# Patient Record
Sex: FEMALE
[De-identification: names, ages, dates, MRNs, and addresses within clinical notes are randomized per-mention and may not be internally consistent; named-entity substitution may affect disease eponyms.]

---

## 2018-03-20 PROBLEM — Z00.00 ENCOUNTER FOR PREVENTIVE HEALTH EXAMINATION: Status: ACTIVE | Noted: 2018-03-20

## 2018-03-21 ENCOUNTER — APPOINTMENT (OUTPATIENT)
Dept: ANTEPARTUM | Facility: CLINIC | Age: 48
End: 2018-03-21

## 2022-07-25 ENCOUNTER — RX RENEWAL (OUTPATIENT)
Age: 52
End: 2022-07-25

## 2022-07-25 DIAGNOSIS — M54.50 LOW BACK PAIN, UNSPECIFIED: ICD-10-CM

## 2022-11-22 ENCOUNTER — RX RENEWAL (OUTPATIENT)
Age: 52
End: 2022-11-22

## 2023-01-03 ENCOUNTER — RX RENEWAL (OUTPATIENT)
Age: 53
End: 2023-01-03

## 2023-02-27 RX ORDER — LIDOCAINE 5% 700 MG/1
5 PATCH TOPICAL
Qty: 30 | Refills: 0 | Status: ACTIVE | COMMUNITY
Start: 2022-07-25 | End: 1900-01-01

## 2023-02-28 ENCOUNTER — APPOINTMENT (OUTPATIENT)
Dept: PAIN MANAGEMENT | Facility: CLINIC | Age: 53
End: 2023-02-28

## 2023-03-21 ENCOUNTER — APPOINTMENT (OUTPATIENT)
Dept: PAIN MANAGEMENT | Facility: CLINIC | Age: 53
End: 2023-03-21

## 2023-03-31 ENCOUNTER — ASOB RESULT (OUTPATIENT)
Age: 53
End: 2023-03-31

## 2023-03-31 ENCOUNTER — APPOINTMENT (OUTPATIENT)
Dept: ANTEPARTUM | Facility: CLINIC | Age: 53
End: 2023-03-31
Payer: COMMERCIAL

## 2023-03-31 PROCEDURE — 76856 US EXAM PELVIC COMPLETE: CPT | Mod: 59

## 2023-03-31 PROCEDURE — 76830 TRANSVAGINAL US NON-OB: CPT

## 2024-03-19 ENCOUNTER — NON-APPOINTMENT (OUTPATIENT)
Age: 54
End: 2024-03-19

## 2024-03-21 ENCOUNTER — APPOINTMENT (OUTPATIENT)
Dept: ORTHOPEDIC SURGERY | Facility: CLINIC | Age: 54
End: 2024-03-21
Payer: COMMERCIAL

## 2024-03-21 DIAGNOSIS — S80.02XA CONTUSION OF LEFT KNEE, INITIAL ENCOUNTER: ICD-10-CM

## 2024-03-21 DIAGNOSIS — S93.401A SPRAIN OF UNSPECIFIED LIGAMENT OF RIGHT ANKLE, INITIAL ENCOUNTER: ICD-10-CM

## 2024-03-21 PROCEDURE — 99203 OFFICE O/P NEW LOW 30 MIN: CPT

## 2024-03-21 PROCEDURE — 73610 X-RAY EXAM OF ANKLE: CPT | Mod: RT

## 2024-03-21 PROCEDURE — 99213 OFFICE O/P EST LOW 20 MIN: CPT

## 2024-03-21 NOTE — PHYSICAL EXAM
[Left] : left knee [NL (0)] : extension 0 degrees [Negative] : negative Zheng's [FreeTextEntry3] : abrasion noted anterior  [Right] : right foot and ankle [Moderate] : moderate swelling of lateral ankle [5___] : eversion 5[unfilled]/5 [] : mildly antalgic [de-identified] : able to go on left foot with pain  [TWNoteComboBox7] : dorsiflexion 10 degrees [de-identified] : plantar flexion 15 degrees [de-identified] : inversion 10 degrees [de-identified] : eversion 10 degrees

## 2024-03-21 NOTE — ASSESSMENT
[FreeTextEntry1] : for the ankle discussed the role of range of motion ice  will get her in a lace up for ambulation  for the knee discussed continued baci and band aid during the day and open at night  rx for diclofenac   f/u 2 weeks prn Dr Neelima lebron

## 2024-03-21 NOTE — HISTORY OF PRESENT ILLNESS
[de-identified] :  03/21/2024: she had an inversion injury and fall on 3/19/24 injuring the right ankle left foot/knee and hip. Initially went to urgent care was told no fractures. she has noted pain and swelling / ecchymosis to the right ankle.  she has abrasion to the left knee has been using bacatracin and band aid. Pain to the dorsal aspect of the left foot and lateral aspect left hip.  she was called by urgent care and told she had an avulsion fracture to the right ankle  she does note ankle injury 30 years ago   works as [employer] [FreeTextEntry5] : Pt initially fell on tuesday and twisted her right ankle and heard and felt a pop with a sudden onset of pain, since then pt hAs developed pain on the top of her left foot and in her left hip with activity

## 2024-03-21 NOTE — IMAGING
[Right] : right ankle [There are no fractures, subluxations or dislocations. No significant abnormalities are seen] : There are no fractures, subluxations or dislocations. No significant abnormalities are seen [FreeTextEntry9] : old small rounded avulsion fragment noted

## 2024-04-03 ENCOUNTER — APPOINTMENT (OUTPATIENT)
Dept: ORTHOPEDIC SURGERY | Facility: CLINIC | Age: 54
End: 2024-04-03

## 2024-05-01 ENCOUNTER — RX RENEWAL (OUTPATIENT)
Age: 54
End: 2024-05-01

## 2024-05-01 RX ORDER — DICLOFENAC SODIUM 75 MG/1
75 TABLET, DELAYED RELEASE ORAL
Qty: 60 | Refills: 0 | Status: ACTIVE | COMMUNITY
Start: 2024-03-21 | End: 1900-01-01